# Patient Record
Sex: MALE | Race: BLACK OR AFRICAN AMERICAN | URBAN - METROPOLITAN AREA
[De-identification: names, ages, dates, MRNs, and addresses within clinical notes are randomized per-mention and may not be internally consistent; named-entity substitution may affect disease eponyms.]

---

## 2021-01-15 ENCOUNTER — OFFICE VISIT (OUTPATIENT)
Dept: URBAN - METROPOLITAN AREA CLINIC 40 | Facility: CLINIC | Age: 76
End: 2021-01-15
Payer: MEDICARE

## 2021-01-15 DIAGNOSIS — K59.03 DRUG INDUCED CONSTIPATION: ICD-10-CM

## 2021-01-15 DIAGNOSIS — T40.2X5A ADVERSE EFFECT OF OTHER OPIOIDS, INITIAL ENCOUNTER: ICD-10-CM

## 2021-01-15 DIAGNOSIS — R14.0 BLOATING: ICD-10-CM

## 2021-01-15 DIAGNOSIS — K21.9 ACID REFLUX DISEASE: ICD-10-CM

## 2021-01-15 PROCEDURE — G8417 CALC BMI ABV UP PARAM F/U: HCPCS | Performed by: INTERNAL MEDICINE

## 2021-01-15 PROCEDURE — G8482 FLU IMMUNIZE ORDER/ADMIN: HCPCS | Performed by: INTERNAL MEDICINE

## 2021-01-15 PROCEDURE — 1036F TOBACCO NON-USER: CPT | Performed by: INTERNAL MEDICINE

## 2021-01-15 PROCEDURE — G8427 DOCREV CUR MEDS BY ELIG CLIN: HCPCS | Performed by: INTERNAL MEDICINE

## 2021-01-15 PROCEDURE — 99213 OFFICE O/P EST LOW 20 MIN: CPT | Performed by: INTERNAL MEDICINE

## 2021-01-15 PROCEDURE — G9903 PT SCRN TBCO ID AS NON USER: HCPCS | Performed by: INTERNAL MEDICINE

## 2021-01-15 RX ORDER — LINACLOTIDE 290 UG/1
1 CAPSULE AT LEAST 30 MINUTES BEFORE THE FIRST MEAL OF THE DAY ON AN EMPTY STOMACH CAPSULE, GELATIN COATED ORAL ONCE A DAY
Qty: 90 | Refills: 3 | OUTPATIENT
Start: 2021-01-15 | End: 2022-01-10

## 2021-01-15 RX ORDER — OMEPRAZOLE 40 MG/1
1 CAPSULE 30 MINUTES BEFORE MORNING MEAL CAPSULE, DELAYED RELEASE ORAL ONCE A DAY
Qty: 90 | Refills: 1 | OUTPATIENT
Start: 2021-01-15

## 2021-01-15 RX ORDER — DULOXETINE 30 MG/1
CAPSULE, DELAYED RELEASE PELLETS ORAL
Qty: 0 | Refills: 0 | Status: ACTIVE | COMMUNITY
Start: 1900-01-01

## 2021-01-15 RX ORDER — LISINOPRIL 40 MG/1
TABLET ORAL 1
Qty: 0 | Refills: 0 | Status: ACTIVE | COMMUNITY
Start: 1900-01-01

## 2021-01-15 NOTE — PHYSICAL EXAM GASTROINTESTINAL
Abdomen , soft, mild diffuse tenderness, nondistended , no guarding or rigidity , no masses palpable , normal bowel sounds , Liver and Spleen , no hepatomegaly present , no hepatosplenomegaly , liver nontender , spleen not palpable

## 2021-01-15 NOTE — HPI-TODAY'S VISIT:
This is a follow-up appointment for this patient, a 75 year old /Black male, after a previous visit few months ago, for an evaluation for abdominal pain. The cause has been felt to be gastritis. He reports that the pain is controlled for the most part with occasional flare. The patient has had further work up which included the following studies: a colonoscopy and a EGD. The colonoscopy revealed no significant findings in the colon. The EGD revealed gastritis. Pantoprazole caused leg pain and thus stopped it. Uses advil very minimally. h/o A.fib/DVT. uses medical marijuana. has had prostate cancer in past and has had radiation and mild pelvic ascitis in past from it. History of elevated LFTs as noted from labs by PCP. repeat labs-normal LFTs. mildly elevated AMA. Intermittent upper abdominal pain. h/o gastritis. CT showed mild pelvic fluid/radiation seeds in prostate. has OIC.  seen at [facility] for abdominal pain 1 year ago, suspected bowel obstruction but improved with NGT. No surgery required. On buprenorphine with past history of opiate dependence. Not taking Amitiza as it did not help. On a daily probiotic. Linzess helps constipation. smoothie with veggis work well for constipation. No rectal bleeding. Mild anemia, with normal iron panel ordered by Dr. Damian. He has stopped taking Zantac due to recall. Has no acid reduction therapy. Rare dysphagia if he does not chew his food well, he admits. diagnosed with MDS recently

## 2021-01-22 ENCOUNTER — OFFICE VISIT (OUTPATIENT)
Dept: URBAN - METROPOLITAN AREA CLINIC 40 | Facility: CLINIC | Age: 76
End: 2021-01-22

## 2021-07-08 ENCOUNTER — ERX REFILL RESPONSE (OUTPATIENT)
Dept: URBAN - METROPOLITAN AREA CLINIC 40 | Facility: CLINIC | Age: 76
End: 2021-07-08

## 2021-07-08 RX ORDER — OMEPRAZOLE 40 MG/1
1 CAPSULE 30 MINUTES BEFORE MORNING MEAL CAPSULE, DELAYED RELEASE ORAL ONCE A DAY
Qty: 90 | Refills: 1 | OUTPATIENT

## 2021-07-08 RX ORDER — OMEPRAZOLE 40 MG/1
TAKE 1 CAPSULE BY MOUTH EVERY DAY 30 MINUTES BEFORE BREAKFAST CAPSULE, DELAYED RELEASE ORAL
Qty: 90 CAPSULE | Refills: 1 | OUTPATIENT

## 2021-07-16 ENCOUNTER — OFFICE VISIT (OUTPATIENT)
Dept: URBAN - METROPOLITAN AREA CLINIC 40 | Facility: CLINIC | Age: 76
End: 2021-07-16
Payer: MEDICARE

## 2021-07-16 ENCOUNTER — WEB ENCOUNTER (OUTPATIENT)
Dept: URBAN - METROPOLITAN AREA CLINIC 40 | Facility: CLINIC | Age: 76
End: 2021-07-16

## 2021-07-16 DIAGNOSIS — R14.0 BLOATING: ICD-10-CM

## 2021-07-16 DIAGNOSIS — T40.2X5A ADVERSE EFFECT OF OTHER OPIOIDS, INITIAL ENCOUNTER: ICD-10-CM

## 2021-07-16 DIAGNOSIS — K59.03 DRUG INDUCED CONSTIPATION: ICD-10-CM

## 2021-07-16 DIAGNOSIS — K21.9 ACID REFLUX DISEASE: ICD-10-CM

## 2021-07-16 PROCEDURE — 99213 OFFICE O/P EST LOW 20 MIN: CPT | Performed by: INTERNAL MEDICINE

## 2021-07-16 RX ORDER — LINACLOTIDE 290 UG/1
1 CAPSULE AT LEAST 30 MINUTES BEFORE THE FIRST MEAL OF THE DAY ON AN EMPTY STOMACH CAPSULE, GELATIN COATED ORAL ONCE A DAY
Qty: 90 | Refills: 3 | OUTPATIENT

## 2021-07-16 RX ORDER — OMEPRAZOLE 40 MG/1
TAKE 1 CAPSULE BY MOUTH EVERY DAY 30 MINUTES BEFORE BREAKFAST CAPSULE, DELAYED RELEASE ORAL
Qty: 90 CAPSULE | Refills: 1 | Status: ACTIVE | COMMUNITY

## 2021-07-16 RX ORDER — LISINOPRIL 40 MG/1
TABLET ORAL 1
Qty: 0 | Refills: 0 | Status: ACTIVE | COMMUNITY
Start: 1900-01-01

## 2021-07-16 RX ORDER — OMEPRAZOLE 40 MG/1
1 CAPSULE 30 MINUTES BEFORE MORNING MEAL CAPSULE, DELAYED RELEASE ORAL ONCE A DAY
Qty: 90 | Refills: 1 | OUTPATIENT

## 2021-07-16 RX ORDER — LINACLOTIDE 290 UG/1
1 CAPSULE AT LEAST 30 MINUTES BEFORE THE FIRST MEAL OF THE DAY ON AN EMPTY STOMACH CAPSULE, GELATIN COATED ORAL ONCE A DAY
Qty: 90 | Refills: 3 | Status: ACTIVE | COMMUNITY
Start: 2021-01-15 | End: 2022-01-10

## 2021-07-16 RX ORDER — DULOXETINE 30 MG/1
CAPSULE, DELAYED RELEASE PELLETS ORAL
Qty: 0 | Refills: 0 | Status: ACTIVE | COMMUNITY
Start: 1900-01-01

## 2021-07-16 NOTE — HPI-TODAY'S VISIT:
This is a follow-up appointment for this patient, a 76 year old /Black male, after a previous visit few months ago, for an evaluation for abdominal pain. The cause has been felt to be gastritis with IBS. He reports that the pain is controlled for the most part with occasional flare. The patient has had further work up which included the following studies: a colonoscopy and a EGD. The colonoscopy revealed no significant findings in the colon. The EGD revealed gastritis. Pantoprazole caused leg pain and thus stopped it. Uses advil very minimally. h/o A.fib/DVT. uses medical marijuana. has had prostate cancer in past and has had radiation and mild pelvic ascitis in past from it. History of elevated LFTs as noted from labs by PCP. repeat labs-normal LFTs. mildly elevated AMA. Intermittent upper abdominal pain. h/o gastritis. CT showed mild pelvic fluid/radiation seeds in prostate. has OIC.  On buprenorphine with past history of opiate dependence. Not taking Amitiza as it did not help. On a daily probiotic. Linzess helps constipation. smoothie with veggis work well for constipation. No rectal bleeding. Mild anemia, with normal iron panel ordered by Dr. Damian. Has no acid reduction therapy. Rare dysphagia if he does not chew his food well, he admits. diagnosed with MDS recently

## 2021-07-18 PROBLEM — 136801000119102: Status: ACTIVE | Noted: 2021-01-15

## 2021-07-18 PROBLEM — 292045009: Status: ACTIVE | Noted: 2021-01-15

## 2021-10-05 ENCOUNTER — ERX REFILL RESPONSE (OUTPATIENT)
Dept: URBAN - METROPOLITAN AREA CLINIC 40 | Facility: CLINIC | Age: 76
End: 2021-10-05

## 2021-10-05 RX ORDER — OMEPRAZOLE 40 MG/1
TAKE 1 CAPSULE BY MOUTH EVERY DAY 30 MINUTES BEFORE BREAKFAST CAPSULE, DELAYED RELEASE ORAL
Qty: 90 CAPSULE | Refills: 1 | OUTPATIENT

## 2021-12-04 ENCOUNTER — ERX REFILL RESPONSE (OUTPATIENT)
Dept: URBAN - METROPOLITAN AREA CLINIC 40 | Facility: CLINIC | Age: 76
End: 2021-12-04

## 2021-12-04 RX ORDER — LINACLOTIDE 290 UG/1
1 CAPSULE AT LEAST 30 MINUTES BEFORE THE FIRST MEAL OF THE DAY ON AN EMPTY STOMACH CAPSULE, GELATIN COATED ORAL ONCE A DAY
Qty: 90 | Refills: 3 | OUTPATIENT

## 2021-12-04 RX ORDER — LINACLOTIDE 290 UG/1
TAKE ONE CAPSULE BY MOUTH ONCE DAILY ON AN EMPTY STOMACH(30 MINUTES BEFORE THE FIRST MEAL OF THE DAY) CAPSULE, GELATIN COATED ORAL
Qty: 90 CAPSULE | Refills: 1 | OUTPATIENT

## 2022-01-03 ENCOUNTER — ERX REFILL RESPONSE (OUTPATIENT)
Dept: URBAN - METROPOLITAN AREA CLINIC 40 | Facility: CLINIC | Age: 77
End: 2022-01-03

## 2022-01-03 RX ORDER — OMEPRAZOLE 40 MG/1
TAKE 1 CAPSULE BY MOUTH EVERY DAY 30 MINUTES BEFORE BREAKFAST CAPSULE, DELAYED RELEASE ORAL
Qty: 90 CAPSULE | Refills: 1 | OUTPATIENT

## 2022-01-10 ENCOUNTER — OFFICE VISIT (OUTPATIENT)
Dept: URBAN - METROPOLITAN AREA CLINIC 40 | Facility: CLINIC | Age: 77
End: 2022-01-10
Payer: MEDICARE

## 2022-01-10 DIAGNOSIS — T40.2X5A ADVERSE EFFECT OF OTHER OPIOIDS, INITIAL ENCOUNTER: ICD-10-CM

## 2022-01-10 DIAGNOSIS — R14.0 BLOATING: ICD-10-CM

## 2022-01-10 DIAGNOSIS — K59.03 DRUG INDUCED CONSTIPATION: ICD-10-CM

## 2022-01-10 DIAGNOSIS — K21.9 ACID REFLUX DISEASE: ICD-10-CM

## 2022-01-10 PROCEDURE — 99213 OFFICE O/P EST LOW 20 MIN: CPT | Performed by: INTERNAL MEDICINE

## 2022-01-10 RX ORDER — OMEPRAZOLE 40 MG/1
1 CAPSULE 30 MINUTES BEFORE MORNING MEAL CAPSULE, DELAYED RELEASE ORAL ONCE A DAY
Qty: 90 | Refills: 1 | OUTPATIENT

## 2022-01-10 RX ORDER — OMEPRAZOLE 40 MG/1
TAKE 1 CAPSULE BY MOUTH EVERY DAY 30 MINUTES BEFORE BREAKFAST CAPSULE, DELAYED RELEASE ORAL
Qty: 90 CAPSULE | Refills: 1 | Status: ACTIVE | COMMUNITY

## 2022-01-10 RX ORDER — LINACLOTIDE 290 UG/1
1 CAPSULE AT LEAST 30 MINUTES BEFORE THE FIRST MEAL OF THE DAY ON AN EMPTY STOMACH CAPSULE, GELATIN COATED ORAL ONCE A DAY
Qty: 90 | Refills: 3 | OUTPATIENT

## 2022-01-10 RX ORDER — LISINOPRIL 40 MG/1
TABLET ORAL 1
Qty: 0 | Refills: 0 | Status: ACTIVE | COMMUNITY
Start: 1900-01-01

## 2022-01-10 RX ORDER — LINACLOTIDE 290 UG/1
TAKE ONE CAPSULE BY MOUTH ONCE DAILY ON AN EMPTY STOMACH(30 MINUTES BEFORE THE FIRST MEAL OF THE DAY) CAPSULE, GELATIN COATED ORAL
Qty: 90 CAPSULE | Refills: 1 | Status: ACTIVE | COMMUNITY

## 2022-01-10 RX ORDER — DULOXETINE 30 MG/1
CAPSULE, DELAYED RELEASE PELLETS ORAL
Qty: 0 | Refills: 0 | Status: ACTIVE | COMMUNITY
Start: 1900-01-01

## 2022-01-10 NOTE — PHYSICAL EXAM GASTROINTESTINAL
Abdomen , soft, NT, nondistended , no guarding or rigidity , no masses palpable , normal bowel sounds , Liver and Spleen , no hepatomegaly present , no hepatosplenomegaly , liver nontender , spleen not palpable

## 2022-02-09 ENCOUNTER — TELEPHONE ENCOUNTER (OUTPATIENT)
Dept: URBAN - METROPOLITAN AREA CLINIC 40 | Facility: CLINIC | Age: 77
End: 2022-02-09

## 2022-02-09 RX ORDER — LINACLOTIDE 290 UG/1
1 CAPSULE AT LEAST 30 MINUTES BEFORE THE FIRST MEAL OF THE DAY ON AN EMPTY STOMACH CAPSULE, GELATIN COATED ORAL ONCE A DAY
Qty: 90 | Refills: 3
End: 2023-02-04

## 2022-02-09 RX ORDER — OMEPRAZOLE 40 MG/1
1 CAPSULE 30 MINUTES BEFORE MORNING MEAL CAPSULE, DELAYED RELEASE ORAL ONCE A DAY
Qty: 90 | Refills: 1

## 2022-04-28 ENCOUNTER — ERX REFILL RESPONSE (OUTPATIENT)
Dept: URBAN - METROPOLITAN AREA CLINIC 40 | Facility: CLINIC | Age: 77
End: 2022-04-28

## 2022-04-28 RX ORDER — OMEPRAZOLE 40 MG/1
TAKE 1 CAPSULE BY MOUTH EVERY DAY 30 MINUTES BEFORE BREAKFAST CAPSULE, DELAYED RELEASE ORAL
Qty: 90 CAPSULE | Refills: 1 | OUTPATIENT

## 2022-06-08 ENCOUNTER — TELEPHONE ENCOUNTER (OUTPATIENT)
Dept: URBAN - METROPOLITAN AREA CLINIC 40 | Facility: CLINIC | Age: 77
End: 2022-06-08

## 2022-06-08 ENCOUNTER — TELEPHONE ENCOUNTER (OUTPATIENT)
Dept: URBAN - METROPOLITAN AREA CLINIC 92 | Facility: CLINIC | Age: 77
End: 2022-06-08

## 2022-06-08 RX ORDER — LACTULOSE 10 G/15ML
15 ML SOLUTION ORAL TWICE DAILY
Qty: 900 MILLILITER | Refills: 3 | OUTPATIENT
Start: 2022-06-08 | End: 2022-10-06

## 2022-06-08 RX ORDER — LACTULOSE 10 G/15ML
15 ML SOLUTION ORAL TWICE DAILY
Qty: 900 MILLILITER | Refills: 3
Start: 2022-06-08 | End: 2022-10-06

## 2022-06-08 RX ORDER — LINACLOTIDE 290 UG/1
1 CAPSULE AT LEAST 30 MINUTES BEFORE THE FIRST MEAL OF THE DAY ON AN EMPTY STOMACH CAPSULE, GELATIN COATED ORAL ONCE A DAY
OUTPATIENT
End: 2023-02-04

## 2022-07-15 ENCOUNTER — OFFICE VISIT (OUTPATIENT)
Dept: URBAN - METROPOLITAN AREA CLINIC 40 | Facility: CLINIC | Age: 77
End: 2022-07-15
Payer: MEDICARE

## 2022-07-15 VITALS
DIASTOLIC BLOOD PRESSURE: 78 MMHG | WEIGHT: 250 LBS | BODY MASS INDEX: 30.44 KG/M2 | HEART RATE: 74 BPM | TEMPERATURE: 98 F | HEIGHT: 76 IN | SYSTOLIC BLOOD PRESSURE: 124 MMHG

## 2022-07-15 DIAGNOSIS — K58.1 IRRITABLE BOWEL SYNDROME WITH CONSTIPATION: ICD-10-CM

## 2022-07-15 DIAGNOSIS — R14.0 BLOATING: ICD-10-CM

## 2022-07-15 DIAGNOSIS — K21.9 GERD WITHOUT ESOPHAGITIS: ICD-10-CM

## 2022-07-15 PROCEDURE — 99213 OFFICE O/P EST LOW 20 MIN: CPT | Performed by: INTERNAL MEDICINE

## 2022-07-15 RX ORDER — LISINOPRIL 40 MG/1
TABLET ORAL 1
Qty: 0 | Refills: 0 | Status: ACTIVE | COMMUNITY
Start: 1900-01-01

## 2022-07-15 RX ORDER — PLECANATIDE 3 MG/1
1 TABLET TABLET ORAL ONCE A DAY
Qty: 90 | Refills: 3 | OUTPATIENT
Start: 2022-07-15 | End: 2023-07-10

## 2022-07-15 RX ORDER — DULOXETINE 30 MG/1
CAPSULE, DELAYED RELEASE PELLETS ORAL
Qty: 0 | Refills: 0 | Status: ACTIVE | COMMUNITY
Start: 1900-01-01

## 2022-07-15 RX ORDER — LINACLOTIDE 290 UG/1
TAKE ONE CAPSULE BY MOUTH ONCE DAILY ON AN EMPTY STOMACH(30 MINUTES BEFORE THE FIRST MEAL OF THE DAY) CAPSULE, GELATIN COATED ORAL
Qty: 90 CAPSULE | Refills: 1 | Status: ON HOLD | COMMUNITY

## 2022-07-15 RX ORDER — LACTULOSE 10 G/15ML
15 ML SOLUTION ORAL TWICE DAILY
Qty: 900 MILLILITER | Refills: 3 | Status: ACTIVE | COMMUNITY
Start: 2022-06-08 | End: 2022-10-06

## 2022-07-15 RX ORDER — OMEPRAZOLE 40 MG/1
TAKE 1 CAPSULE BY MOUTH EVERY DAY 30 MINUTES BEFORE BREAKFAST CAPSULE, DELAYED RELEASE ORAL
Qty: 90 CAPSULE | Refills: 1 | Status: ACTIVE | COMMUNITY

## 2022-07-15 NOTE — HPI-TODAY'S VISIT:
This is a follow-up appointment for this patient, a 77 year old /Black male, after a previous visit few months ago, for an evaluation for abdominal pain. The cause has been felt to be gastritis with IBS. He reports that the pain is controlled for the most part with occasional flare. The patient has had further work up which included the following studies: a colonoscopy and a EGD. The colonoscopy revealed no significant findings in the colon. The EGD revealed gastritis. Pantoprazole caused leg pain and thus stopped it. Uses advil very minimally. h/o A.fib/DVT. uses medical marijuana. has had prostate cancer in past and has had radiation and mild pelvic ascitis in past from it. History of elevated LFTs as noted from labs by PCP. repeat labs-normal LFTs. mildly elevated AMA. Intermittent upper abdominal pain. h/o gastritis. CT showed mild pelvic fluid/radiation seeds in prostate. has OIC.  On buprenorphine with past history of opiate dependence. Not taking Amitiza as it did not help. On a daily probiotic. Linzess helps constipation. too expensive. lactulose helps, though make him bloated.  No rectal bleeding. Mild anemia, with normal iron panel ordered by Dr. Damian. omeprazoloe helps. Rare dysphagia if he does not chew his food well, he admits. diagnosed with MDS recently

## 2022-10-20 ENCOUNTER — ERX REFILL RESPONSE (OUTPATIENT)
Dept: URBAN - METROPOLITAN AREA CLINIC 40 | Facility: CLINIC | Age: 77
End: 2022-10-20

## 2022-10-20 RX ORDER — OMEPRAZOLE 40 MG/1
TAKE 1 CAPSULE BY MOUTH EVERY DAY 30 MINUTES BEFORE BREAKFAST CAPSULE, DELAYED RELEASE ORAL
Qty: 90 CAPSULE | Refills: 0 | OUTPATIENT

## 2023-01-20 ENCOUNTER — OFFICE VISIT (OUTPATIENT)
Dept: URBAN - METROPOLITAN AREA CLINIC 40 | Facility: CLINIC | Age: 78
End: 2023-01-20
Payer: MEDICARE

## 2023-01-20 VITALS
HEART RATE: 74 BPM | SYSTOLIC BLOOD PRESSURE: 120 MMHG | DIASTOLIC BLOOD PRESSURE: 74 MMHG | HEIGHT: 76 IN | TEMPERATURE: 97.7 F | BODY MASS INDEX: 27.89 KG/M2 | WEIGHT: 229 LBS

## 2023-01-20 DIAGNOSIS — K58.1 IRRITABLE BOWEL SYNDROME WITH CONSTIPATION: ICD-10-CM

## 2023-01-20 DIAGNOSIS — Z86.010 PERSONAL HISTORY OF COLONIC POLYPS: ICD-10-CM

## 2023-01-20 DIAGNOSIS — K21.9 GERD WITHOUT ESOPHAGITIS: ICD-10-CM

## 2023-01-20 DIAGNOSIS — R14.0 BLOATING: ICD-10-CM

## 2023-01-20 PROCEDURE — 99213 OFFICE O/P EST LOW 20 MIN: CPT | Performed by: INTERNAL MEDICINE

## 2023-01-20 RX ORDER — OMEPRAZOLE 40 MG/1
TAKE 1 CAPSULE BY MOUTH EVERY DAY 30 MINUTES BEFORE BREAKFAST CAPSULE, DELAYED RELEASE ORAL
Qty: 90 CAPSULE | Refills: 0 | Status: ACTIVE | COMMUNITY

## 2023-01-20 RX ORDER — OMEPRAZOLE 40 MG/1
1 TABLET 30 MINUTES BEFORE MORNING MEAL CAPSULE, DELAYED RELEASE ORAL ONCE A DAY
Qty: 30 | Refills: 2

## 2023-01-20 RX ORDER — PLECANATIDE 3 MG/1
1 TABLET TABLET ORAL ONCE A DAY
Qty: 90 | Refills: 3 | Status: ON HOLD | COMMUNITY
Start: 2022-07-15 | End: 2023-07-10

## 2023-01-20 RX ORDER — DULOXETINE 30 MG/1
CAPSULE, DELAYED RELEASE PELLETS ORAL
Qty: 0 | Refills: 0 | Status: ACTIVE | COMMUNITY
Start: 1900-01-01

## 2023-01-20 RX ORDER — LISINOPRIL 40 MG/1
TABLET ORAL 1
Qty: 0 | Refills: 0 | Status: ACTIVE | COMMUNITY
Start: 1900-01-01

## 2023-01-20 RX ORDER — LINACLOTIDE 290 UG/1
TAKE ONE CAPSULE BY MOUTH ONCE DAILY ON AN EMPTY STOMACH(30 MINUTES BEFORE THE FIRST MEAL OF THE DAY) CAPSULE, GELATIN COATED ORAL
Qty: 90 CAPSULE | Refills: 1 | Status: ACTIVE | COMMUNITY

## 2023-01-20 NOTE — HPI-TODAY'S VISIT:
This is a follow-up appointment for this patient, a 78 year old /Black male, after a previous visit few months ago, for an evaluation for abdominal pain. The cause has been felt to be gastritis with IBS. He reports that the pain is controlled for the most part with occasional flare. The patient has had further work up which included the following studies: a colonoscopy and a EGD. The colonoscopy revealed no significant findings in the colon. The EGD revealed gastritis. Pantoprazole caused leg pain and thus stopped it. Uses advil very minimally. h/o A.fib/DVT-on xarelto. uses medical marijuana. has had prostate cancer in past and has had radiation and mild pelvic ascitis in past from it. History of elevated LFTs as noted from labs by PCP. repeat labs-normal LFTs. mildly elevated AMA. CT showed mild pelvic fluid/radiation seeds in prostate. has OIC.  On buprenorphine with past history of opiate dependence. Not taking Amitiza as it did not help. On a daily probiotic. Linzess helps constipation. too expensive.uses it prn. No rectal bleeding. Mild anemia, with normal iron panel ordered by Dr. Damian. omeprazole helps. Rare dysphagia if he does not chew his food well, he admits. diagnosed with MDS recently

## 2023-01-21 PROBLEM — 266435005: Status: ACTIVE | Noted: 2022-07-15

## 2023-01-21 PROBLEM — 440630006: Status: ACTIVE | Noted: 2022-07-15

## 2023-01-21 PROBLEM — 428283002: Status: ACTIVE | Noted: 2023-01-20

## 2023-07-09 ENCOUNTER — ERX REFILL RESPONSE (OUTPATIENT)
Dept: URBAN - METROPOLITAN AREA CLINIC 40 | Facility: CLINIC | Age: 78
End: 2023-07-09

## 2023-07-09 RX ORDER — LINACLOTIDE 290 UG/1
TAKE 1 CAPSULE BY MOUTH EVERY DAY 30 MINUTES BEFORE FIRST MEAL OF THE DAY ON AN EMPTY STOMACH CAPSULE, GELATIN COATED ORAL
Qty: 90 CAPSULE | Refills: 0 | OUTPATIENT

## 2023-07-21 ENCOUNTER — LAB OUTSIDE AN ENCOUNTER (OUTPATIENT)
Dept: URBAN - METROPOLITAN AREA CLINIC 40 | Facility: CLINIC | Age: 78
End: 2023-07-21

## 2023-07-21 ENCOUNTER — OFFICE VISIT (OUTPATIENT)
Dept: URBAN - METROPOLITAN AREA CLINIC 40 | Facility: CLINIC | Age: 78
End: 2023-07-21
Payer: MEDICARE

## 2023-07-21 VITALS
TEMPERATURE: 94.5 F | DIASTOLIC BLOOD PRESSURE: 80 MMHG | WEIGHT: 232.4 LBS | BODY MASS INDEX: 28.3 KG/M2 | HEART RATE: 73 BPM | HEIGHT: 76 IN | SYSTOLIC BLOOD PRESSURE: 140 MMHG

## 2023-07-21 DIAGNOSIS — R14.0 BLOATING: ICD-10-CM

## 2023-07-21 DIAGNOSIS — K21.9 GERD WITHOUT ESOPHAGITIS: ICD-10-CM

## 2023-07-21 DIAGNOSIS — K58.1 IRRITABLE BOWEL SYNDROME WITH CONSTIPATION: ICD-10-CM

## 2023-07-21 DIAGNOSIS — Z86.010 PERSONAL HISTORY OF COLONIC POLYPS: ICD-10-CM

## 2023-07-21 PROCEDURE — 99214 OFFICE O/P EST MOD 30 MIN: CPT | Performed by: INTERNAL MEDICINE

## 2023-07-21 RX ORDER — LINACLOTIDE 290 UG/1
TAKE ONE CAPSULE BY MOUTH ONCE DAILY ON AN EMPTY STOMACH(30 MINUTES BEFORE THE FIRST MEAL OF THE DAY) CAPSULE, GELATIN COATED ORAL
Qty: 90 CAPSULE | Refills: 1 | Status: ACTIVE | COMMUNITY

## 2023-07-21 RX ORDER — LISINOPRIL 40 MG/1
TABLET ORAL 1
Qty: 0 | Refills: 0 | Status: ACTIVE | COMMUNITY
Start: 1900-01-01

## 2023-07-21 RX ORDER — LINACLOTIDE 290 UG/1
TAKE ONE CAPSULE BY MOUTH ONCE DAILY ON AN EMPTY STOMACH(30 MINUTES BEFORE THE FIRST MEAL OF THE DAY) CAPSULE, GELATIN COATED ORAL
Qty: 90 CAPSULE | Refills: 1

## 2023-07-21 RX ORDER — OMEPRAZOLE 40 MG/1
1 TABLET 30 MINUTES BEFORE MORNING MEAL CAPSULE, DELAYED RELEASE ORAL ONCE A DAY
Qty: 30 | Refills: 2 | Status: ACTIVE | COMMUNITY

## 2023-07-21 RX ORDER — POLYETHYLENE GLYOCOL 3350, SODIUM CHLORIDE, SODIUM BICARBONATE AND POTASSIUM CHLORIDE 420; 11.2; 5.72; 1.48 G/4L; G/4L; G/4L; G/4L
AS DIRECTED POWDER, FOR SOLUTION NASOGASTRIC; ORAL ONCE
Qty: 1 | Refills: 0 | OUTPATIENT
Start: 2023-07-21 | End: 2023-07-22

## 2023-07-21 RX ORDER — DULOXETINE 30 MG/1
CAPSULE, DELAYED RELEASE PELLETS ORAL
Qty: 0 | Refills: 0 | Status: ACTIVE | COMMUNITY
Start: 1900-01-01

## 2023-07-21 RX ORDER — AMLODIPINE BESYLATE 5 MG/1
1 TABLET TABLET ORAL ONCE A DAY
Status: ACTIVE | COMMUNITY

## 2023-07-21 NOTE — HPI-TODAY'S VISIT:
This is a follow-up appointment for this patient, a 78 year old /Black male, after a previous visit few months ago, for an evaluation for abdominal pain. The cause has been felt to be gastritis with IBS. He reports that the pain is controlled for the most part with occasional flare. The patient has had further work up which included the following studies: a colonoscopy and a EGD. The colonoscopy 7 years ago revealed small TA. The EGD revealed gastritis. Pantoprazole caused leg pain and thus stopped it. Uses advil very minimally. on omeprazole prn. h/o A.fib/DVT-on xarelto. uses medical marijuana. has had prostate cancer in past and has had radiation and mild pelvic ascitis in past from it. History of elevated LFTs as noted from labs by PCP. repeat labs-normal LFTs. mildly elevated AMA. CT showed mild pelvic fluid/radiation seeds in prostate. has OIC.  On buprenorphine with past history of opiate dependence. Not taking Amitiza as it did not help. On a daily probiotic. Linzess helps constipation. too expensive.uses it prn. No rectal bleeding. Mild anemia, with normal iron panel ordered by Dr. Damian. omeprazole helps. Rare dysphagia if he does not chew his food well, he admits. diagnosed with MDS recently

## 2023-08-01 ENCOUNTER — ERX REFILL RESPONSE (OUTPATIENT)
Dept: URBAN - METROPOLITAN AREA CLINIC 40 | Facility: CLINIC | Age: 78
End: 2023-08-01

## 2023-08-01 RX ORDER — OMEPRAZOLE 40 MG/1
1 TABLET 30 MINUTES BEFORE MORNING MEAL CAPSULE, DELAYED RELEASE ORAL ONCE A DAY
Qty: 30 | Refills: 2 | OUTPATIENT

## 2023-12-15 ENCOUNTER — TELEPHONE ENCOUNTER (OUTPATIENT)
Dept: URBAN - METROPOLITAN AREA CLINIC 40 | Facility: CLINIC | Age: 78
End: 2023-12-15

## 2023-12-15 RX ORDER — OMEPRAZOLE 40 MG/1
1 TABLET 30 MINUTES BEFORE MORNING MEAL CAPSULE, DELAYED RELEASE ORAL TWICE DAILY
Qty: 60 | Refills: 5

## 2024-01-12 ENCOUNTER — DASHBOARD ENCOUNTERS (OUTPATIENT)
Age: 79
End: 2024-01-12

## 2024-01-12 ENCOUNTER — OFFICE VISIT (OUTPATIENT)
Dept: URBAN - METROPOLITAN AREA CLINIC 40 | Facility: CLINIC | Age: 79
End: 2024-01-12
Payer: MEDICARE

## 2024-01-12 VITALS
BODY MASS INDEX: 29.54 KG/M2 | SYSTOLIC BLOOD PRESSURE: 140 MMHG | WEIGHT: 242.6 LBS | HEIGHT: 76 IN | HEART RATE: 76 BPM | DIASTOLIC BLOOD PRESSURE: 80 MMHG | TEMPERATURE: 96.8 F

## 2024-01-12 DIAGNOSIS — K58.1 IRRITABLE BOWEL SYNDROME WITH CONSTIPATION: ICD-10-CM

## 2024-01-12 DIAGNOSIS — Z86.010 PERSONAL HISTORY OF COLONIC POLYPS: ICD-10-CM

## 2024-01-12 DIAGNOSIS — K21.9 GERD WITHOUT ESOPHAGITIS: ICD-10-CM

## 2024-01-12 DIAGNOSIS — R14.0 BLOATING: ICD-10-CM

## 2024-01-12 PROCEDURE — 99213 OFFICE O/P EST LOW 20 MIN: CPT | Performed by: INTERNAL MEDICINE

## 2024-01-12 RX ORDER — LINACLOTIDE 290 UG/1
TAKE ONE CAPSULE BY MOUTH ONCE DAILY ON AN EMPTY STOMACH(30 MINUTES BEFORE THE FIRST MEAL OF THE DAY) CAPSULE, GELATIN COATED ORAL
Qty: 90 CAPSULE | Refills: 1 | Status: ACTIVE | COMMUNITY

## 2024-01-12 RX ORDER — DULOXETINE 30 MG/1
CAPSULE, DELAYED RELEASE PELLETS ORAL
Qty: 0 | Refills: 0 | Status: ACTIVE | COMMUNITY
Start: 1900-01-01

## 2024-01-12 RX ORDER — OMEPRAZOLE 40 MG/1
1 TABLET 30 MINUTES BEFORE MORNING MEAL CAPSULE, DELAYED RELEASE ORAL TWICE DAILY
Qty: 60 | Refills: 5 | Status: ACTIVE | COMMUNITY

## 2024-01-12 RX ORDER — AMLODIPINE BESYLATE 5 MG/1
1 TABLET TABLET ORAL ONCE A DAY
Status: ACTIVE | COMMUNITY

## 2024-01-12 RX ORDER — LISINOPRIL 40 MG/1
TABLET ORAL 1
Qty: 0 | Refills: 0 | Status: ACTIVE | COMMUNITY
Start: 1900-01-01

## 2024-01-12 RX ORDER — LINACLOTIDE 290 UG/1
TAKE ONE CAPSULE BY MOUTH ONCE DAILY ON AN EMPTY STOMACH(30 MINUTES BEFORE THE FIRST MEAL OF THE DAY) CAPSULE, GELATIN COATED ORAL
Qty: 90 CAPSULE | Refills: 1

## 2024-03-08 ENCOUNTER — LAB (OUTPATIENT)
Dept: URBAN - METROPOLITAN AREA CLINIC 74 | Facility: CLINIC | Age: 79
End: 2024-03-08

## 2024-05-02 ENCOUNTER — CLAIMS CREATED FROM THE CLAIM WINDOW (OUTPATIENT)
Dept: URBAN - METROPOLITAN AREA SURGERY CENTER 30 | Facility: SURGERY CENTER | Age: 79
End: 2024-05-02

## 2024-05-02 ENCOUNTER — OUT OF OFFICE VISIT (OUTPATIENT)
Dept: URBAN - METROPOLITAN AREA SURGERY CENTER 30 | Facility: SURGERY CENTER | Age: 79
End: 2024-05-02
Payer: MEDICARE

## 2024-05-02 ENCOUNTER — CLAIMS CREATED FROM THE CLAIM WINDOW (OUTPATIENT)
Dept: URBAN - METROPOLITAN AREA CLINIC 4 | Facility: CLINIC | Age: 79
End: 2024-05-02
Payer: MEDICARE

## 2024-05-02 DIAGNOSIS — D12.2 BENIGN NEOPLASM OF ASCENDING COLON: ICD-10-CM

## 2024-05-02 DIAGNOSIS — D12.3 BENIGN NEOPLASM OF TRANSVERSE COLON: ICD-10-CM

## 2024-05-02 DIAGNOSIS — D12.2 ADENOMA OF ASCENDING COLON: ICD-10-CM

## 2024-05-02 DIAGNOSIS — Z09 CARDIOLOGY FOLLOW-UP ENCOUNTER: ICD-10-CM

## 2024-05-02 DIAGNOSIS — D12.3 ADENOMA OF TRANSVERSE COLON: ICD-10-CM

## 2024-05-02 DIAGNOSIS — Z86.010 ADENOMAS PERSONAL HISTORY OF COLONIC POLYPS: ICD-10-CM

## 2024-05-02 DIAGNOSIS — F41.9 ACUTE ANXIETY: ICD-10-CM

## 2024-05-02 PROCEDURE — 88305 TISSUE EXAM BY PATHOLOGIST: CPT | Performed by: PATHOLOGY

## 2024-05-02 PROCEDURE — 00811 ANES LWR INTST NDSC NOS: CPT | Performed by: NURSE ANESTHETIST, CERTIFIED REGISTERED

## 2024-05-02 PROCEDURE — 45385 COLONOSCOPY W/LESION REMOVAL: CPT | Performed by: INTERNAL MEDICINE

## 2024-05-02 RX ORDER — DULOXETINE 30 MG/1
CAPSULE, DELAYED RELEASE PELLETS ORAL
Qty: 0 | Refills: 0 | Status: ACTIVE | COMMUNITY
Start: 1900-01-01

## 2024-05-02 RX ORDER — OMEPRAZOLE 40 MG/1
TAKE 1 CAPSULE BY MOUTH TWICE DAILY 1ST DOSE 30 MINUTES BEFORE BREAKFAST CAPSULE, DELAYED RELEASE ORAL
Qty: 60 CAPSULE | Refills: 0 | Status: ACTIVE | COMMUNITY

## 2024-05-02 RX ORDER — LISINOPRIL 40 MG/1
TABLET ORAL 1
Qty: 0 | Refills: 0 | Status: ACTIVE | COMMUNITY
Start: 1900-01-01

## 2024-05-02 RX ORDER — LINACLOTIDE 290 UG/1
TAKE ONE CAPSULE BY MOUTH ONCE DAILY ON AN EMPTY STOMACH(30 MINUTES BEFORE THE FIRST MEAL OF THE DAY) CAPSULE, GELATIN COATED ORAL
Qty: 90 CAPSULE | Refills: 1 | Status: ACTIVE | COMMUNITY

## 2024-05-02 RX ORDER — AMLODIPINE BESYLATE 5 MG/1
1 TABLET TABLET ORAL ONCE A DAY
Status: ACTIVE | COMMUNITY

## 2024-07-12 ENCOUNTER — OFFICE VISIT (OUTPATIENT)
Dept: URBAN - METROPOLITAN AREA CLINIC 40 | Facility: CLINIC | Age: 79
End: 2024-07-12

## 2024-07-12 RX ORDER — OMEPRAZOLE 40 MG/1
TAKE 1 CAPSULE BY MOUTH TWICE DAILY 1ST DOSE 30 MINUTES BEFORE BREAKFAST CAPSULE, DELAYED RELEASE ORAL
Qty: 60 CAPSULE | Refills: 0 | Status: ACTIVE | COMMUNITY

## 2024-07-12 RX ORDER — LINACLOTIDE 290 UG/1
TAKE ONE CAPSULE BY MOUTH ONCE DAILY ON AN EMPTY STOMACH(30 MINUTES BEFORE THE FIRST MEAL OF THE DAY) CAPSULE, GELATIN COATED ORAL
Qty: 90 CAPSULE | Refills: 1 | Status: ACTIVE | COMMUNITY

## 2024-07-12 RX ORDER — LISINOPRIL 40 MG/1
TABLET ORAL 1
Qty: 0 | Refills: 0 | Status: ACTIVE | COMMUNITY
Start: 1900-01-01

## 2024-07-12 RX ORDER — DULOXETINE 30 MG/1
CAPSULE, DELAYED RELEASE PELLETS ORAL
Qty: 0 | Refills: 0 | Status: ACTIVE | COMMUNITY
Start: 1900-01-01

## 2024-07-12 RX ORDER — AMLODIPINE BESYLATE 5 MG/1
1 TABLET TABLET ORAL ONCE A DAY
Status: ACTIVE | COMMUNITY

## 2024-10-01 ENCOUNTER — OFFICE VISIT (OUTPATIENT)
Dept: URBAN - METROPOLITAN AREA CLINIC 40 | Facility: CLINIC | Age: 79
End: 2024-10-01
Payer: MEDICARE

## 2024-10-01 VITALS
DIASTOLIC BLOOD PRESSURE: 84 MMHG | TEMPERATURE: 98.2 F | HEART RATE: 87 BPM | BODY MASS INDEX: 28.37 KG/M2 | SYSTOLIC BLOOD PRESSURE: 124 MMHG | HEIGHT: 76 IN | WEIGHT: 233 LBS

## 2024-10-01 DIAGNOSIS — Z09 FOLLOW UP: ICD-10-CM

## 2024-10-01 DIAGNOSIS — K21.9 GERD WITHOUT ESOPHAGITIS: ICD-10-CM

## 2024-10-01 DIAGNOSIS — R14.0 BLOATING: ICD-10-CM

## 2024-10-01 DIAGNOSIS — Z86.0101 H/O ADENOMATOUS POLYP OF COLON: ICD-10-CM

## 2024-10-01 DIAGNOSIS — K58.1 IRRITABLE BOWEL SYNDROME WITH CONSTIPATION: ICD-10-CM

## 2024-10-01 PROCEDURE — 99213 OFFICE O/P EST LOW 20 MIN: CPT | Performed by: INTERNAL MEDICINE

## 2024-10-01 RX ORDER — DULOXETINE 30 MG/1
CAPSULE, DELAYED RELEASE PELLETS ORAL
Qty: 0 | Refills: 0 | Status: ACTIVE | COMMUNITY

## 2024-10-01 RX ORDER — LISINOPRIL 40 MG/1
TABLET ORAL 1
Qty: 0 | Refills: 0 | Status: ACTIVE | COMMUNITY

## 2024-10-01 RX ORDER — LINACLOTIDE 290 UG/1
TAKE ONE CAPSULE BY MOUTH ONCE DAILY ON AN EMPTY STOMACH(30 MINUTES BEFORE THE FIRST MEAL OF THE DAY) CAPSULE, GELATIN COATED ORAL
Qty: 90 CAPSULE | Refills: 1 | Status: ACTIVE | COMMUNITY

## 2024-10-01 RX ORDER — OMEPRAZOLE 40 MG/1
TAKE 1 CAPSULE BY MOUTH TWICE DAILY 1ST DOSE 30 MINUTES BEFORE BREAKFAST CAPSULE, DELAYED RELEASE ORAL
Qty: 60 CAPSULE | Refills: 0 | Status: ACTIVE | COMMUNITY

## 2024-10-01 RX ORDER — LINACLOTIDE 290 UG/1
TAKE ONE CAPSULE BY MOUTH ONCE DAILY ON AN EMPTY STOMACH(30 MINUTES BEFORE THE FIRST MEAL OF THE DAY) CAPSULE, GELATIN COATED ORAL
Qty: 90 CAPSULE | Refills: 3

## 2024-10-01 RX ORDER — AMLODIPINE BESYLATE 5 MG/1
1 TABLET TABLET ORAL ONCE A DAY
Status: ACTIVE | COMMUNITY

## 2024-10-01 NOTE — HPI-TODAY'S VISIT:
This is a follow-up appointment for this patient, a 79 year old /Black male, after a previous visit few months ago, for an evaluation for abdominal pain. The cause has been felt to be gastritis with IBS. He reports that the pain is controlled for the most part with occasional flare. The patient has had further work up which included the following studies: a colonoscopy and a EGD. The colonoscopy 7 years ago revealed small TA. colonoscopy from 5/24-poor prep. has tubulovillous adenoma/tubular adenoma/diverticulosis. The EGD from years ago revealed gastritis. Pantoprazole caused leg pain and thus stopped it. Uses advil very minimally. on omeprazole prn. h/o A.fib/DVT-on xarelto. uses medical marijuana. has had prostate cancer in past and has had radiation and mild pelvic ascitis in past from it. History of elevated LFTs as noted from labs by PCP. repeat labs-normal LFTs. mildly elevated AMA. CT showed mild pelvic fluid/radiation seeds in prostate. has OIC.  On buprenorphine with past history of opiate dependence. Not taking Amitiza as it did not help. On a daily probiotic. Linzess helps constipation. too expensive.uses it prn. No rectal bleeding. Mild anemia, with normal iron panel ordered by Dr. Damian. omeprazole helps. Rare dysphagia if he does not chew his food well, he admits. diagnosed with MDS recently.

## 2024-11-05 ENCOUNTER — OFFICE VISIT (OUTPATIENT)
Dept: URBAN - METROPOLITAN AREA CLINIC 40 | Facility: CLINIC | Age: 79
End: 2024-11-05

## 2025-01-06 ENCOUNTER — OFFICE VISIT (OUTPATIENT)
Dept: URBAN - METROPOLITAN AREA CLINIC 40 | Facility: CLINIC | Age: 80
End: 2025-01-06

## 2025-01-06 RX ORDER — DULOXETINE 30 MG/1
CAPSULE, DELAYED RELEASE PELLETS ORAL
Qty: 0 | Refills: 0 | Status: ACTIVE | COMMUNITY

## 2025-01-06 RX ORDER — AMLODIPINE BESYLATE 5 MG/1
1 TABLET TABLET ORAL ONCE A DAY
Status: ACTIVE | COMMUNITY

## 2025-01-06 RX ORDER — LINACLOTIDE 290 UG/1
TAKE ONE CAPSULE BY MOUTH ONCE DAILY ON AN EMPTY STOMACH(30 MINUTES BEFORE THE FIRST MEAL OF THE DAY) CAPSULE, GELATIN COATED ORAL
Qty: 90 CAPSULE | Refills: 3 | Status: ACTIVE | COMMUNITY

## 2025-01-06 RX ORDER — OMEPRAZOLE 40 MG/1
TAKE 1 CAPSULE BY MOUTH TWICE DAILY 1ST DOSE 30 MINUTES BEFORE BREAKFAST CAPSULE, DELAYED RELEASE ORAL
Qty: 60 CAPSULE | Refills: 0 | Status: ACTIVE | COMMUNITY

## 2025-01-06 RX ORDER — LISINOPRIL 40 MG/1
TABLET ORAL 1
Qty: 0 | Refills: 0 | Status: ACTIVE | COMMUNITY

## 2025-04-05 ENCOUNTER — ERX REFILL RESPONSE (OUTPATIENT)
Dept: URBAN - METROPOLITAN AREA CLINIC 40 | Facility: CLINIC | Age: 80
End: 2025-04-05

## 2025-04-05 RX ORDER — OMEPRAZOLE 40 MG/1
TAKE 1 CAPSULE BY MOUTH TWICE DAILY 1ST DOSE 30 MINUTES BEFORE BREAKFAST CAPSULE, DELAYED RELEASE ORAL
Qty: 60 CAPSULE | Refills: 0

## 2025-08-04 ENCOUNTER — WEB ENCOUNTER (OUTPATIENT)
Dept: URBAN - METROPOLITAN AREA CLINIC 40 | Facility: CLINIC | Age: 80
End: 2025-08-04

## 2025-08-07 ENCOUNTER — OFFICE VISIT (OUTPATIENT)
Dept: URBAN - METROPOLITAN AREA CLINIC 40 | Facility: CLINIC | Age: 80
End: 2025-08-07
Payer: MEDICARE

## 2025-08-07 DIAGNOSIS — K58.1 IRRITABLE BOWEL SYNDROME WITH CONSTIPATION: ICD-10-CM

## 2025-08-07 DIAGNOSIS — Z86.0101 H/O ADENOMATOUS POLYP OF COLON: ICD-10-CM

## 2025-08-07 DIAGNOSIS — K59.09 CHRONIC CONSTIPATION: ICD-10-CM

## 2025-08-07 PROCEDURE — 99213 OFFICE O/P EST LOW 20 MIN: CPT | Performed by: PHYSICIAN ASSISTANT

## 2025-08-07 RX ORDER — OMEPRAZOLE 40 MG/1
TAKE 1 CAPSULE BY MOUTH TWICE DAILY 1ST DOSE 30 MINUTES BEFORE BREAKFAST CAPSULE, DELAYED RELEASE ORAL
Qty: 60 CAPSULE | Refills: 0 | Status: ACTIVE | COMMUNITY

## 2025-08-07 RX ORDER — LINACLOTIDE 290 UG/1
TAKE ONE CAPSULE BY MOUTH ONCE DAILY ON AN EMPTY STOMACH(30 MINUTES BEFORE THE FIRST MEAL OF THE DAY) CAPSULE, GELATIN COATED ORAL ONCE A DAY
Qty: 90 CAPSULE | Refills: 1

## 2025-08-07 RX ORDER — AMLODIPINE BESYLATE 5 MG/1
1 TABLET TABLET ORAL ONCE A DAY
Status: ACTIVE | COMMUNITY

## 2025-08-07 RX ORDER — LINACLOTIDE 290 UG/1
TAKE ONE CAPSULE BY MOUTH ONCE DAILY ON AN EMPTY STOMACH(30 MINUTES BEFORE THE FIRST MEAL OF THE DAY) CAPSULE, GELATIN COATED ORAL
Qty: 90 CAPSULE | Refills: 3 | Status: ACTIVE | COMMUNITY

## 2025-08-07 RX ORDER — DULOXETINE 30 MG/1
CAPSULE, DELAYED RELEASE PELLETS ORAL
Qty: 0 | Refills: 0 | Status: ACTIVE | COMMUNITY

## 2025-08-07 RX ORDER — LISINOPRIL 40 MG/1
TABLET ORAL 1
Qty: 0 | Refills: 0 | Status: ACTIVE | COMMUNITY

## 2025-08-07 RX ORDER — NALOXEGOL OXALATE 25 MG/1
1 TABLET IN THE MORNING TABLET, FILM COATED ORAL ONCE A DAY
Qty: 30 TABLET | Refills: 1 | OUTPATIENT
Start: 2025-08-07 | End: 2025-10-06

## 2025-08-08 ENCOUNTER — TELEPHONE ENCOUNTER (OUTPATIENT)
Dept: URBAN - METROPOLITAN AREA CLINIC 40 | Facility: CLINIC | Age: 80
End: 2025-08-08